# Patient Record
(demographics unavailable — no encounter records)

---

## 2024-11-18 NOTE — PHYSICAL EXAM
[Alert] : alert [Well Nourished] : well nourished [No Acute Distress] : no acute distress [Well Developed] : well developed [Normal Sclera/Conjunctiva] : normal sclera/conjunctiva [EOMI] : extra ocular movement intact [No Proptosis] : no proptosis [Normal Oropharynx] : the oropharynx was normal [Thyroid Not Enlarged] : the thyroid was not enlarged [No Thyroid Nodules] : no palpable thyroid nodules [No Respiratory Distress] : no respiratory distress [No Accessory Muscle Use] : no accessory muscle use [Clear to Auscultation] : lungs were clear to auscultation bilaterally [Normal PMI] : the apical impulse was normal [Normal S1, S2] : normal S1 and S2 [Normal Rate] : heart rate was normal [Regular Rhythm] : with a regular rhythm [Pedal Pulses Normal] : the pedal pulses are present [Normal Bowel Sounds] : normal bowel sounds [Not Tender] : non-tender [Not Distended] : not distended [Soft] : abdomen soft [Normal Anterior Cervical Nodes] : no anterior cervical lymphadenopathy [No Spinal Tenderness] : no spinal tenderness [Spine Straight] : spine straight [No Stigmata of Cushings Syndrome] : no stigmata of Cushings Syndrome [Normal Gait] : normal gait [Normal Strength/Tone] : muscle strength and tone were normal [No Rash] : no rash [Acanthosis Nigricans] : no acanthosis nigricans [Normal Reflexes] : deep tendon reflexes were 2+ and symmetric [No Tremors] : no tremors [Oriented x3] : oriented to person, place, and time [de-identified] : trace edema

## 2024-11-18 NOTE — HISTORY OF PRESENT ILLNESS
[FreeTextEntry1] : Patient with type 1 diabetes since she was a young child aged 4. She had been on Omni pod a few years. and Dexcom and Tandem T slim pump.  Her blood sugars are variable; she is mostly a snacker and did not always remember to bolus. She has had poor control for many years.  Recently was pregnant and had adjustments made in pump settings at [facility] and the RD. she is now postpartum 9 months. She did meet with Amy Ruth. [Continuous Glucose Monitoring] : Continuous Glucose Monitoring: Yes [Dexcom] : Dexcom [FreeTextEntry2] : 64 [FreeTextEntry3] : 22+11 [FreeTextEntry4] : 1.9 [de-identified] : 7.2 [FreeTextEntry6] : 08615

## 2024-11-18 NOTE — THERAPY
[_____] :  [unfilled] mg/dL [Insulin on Board (IOB) Duration = ____ hours] : Insulin on Board (IOB) Duration  = [unfilled] hours [de-identified] : Tandem

## 2024-11-18 NOTE — ASSESSMENT
[Diabetes Foot Care] : diabetes foot care [Long Term Vascular Complications] : long term vascular complications of diabetes [Carbohydrate Consistent Diet] : carbohydrate consistent diet [Importance of Diet and Exercise] : importance of diet and exercise to improve glycemic control, achieve weight loss and improve cardiovascular health [Hypoglycemia Management] : hypoglycemia management [Self Monitoring of Blood Glucose] : self monitoring of blood glucose [Retinopathy Screening] : Patient was referred to ophthalmology for retinopathy screening [Diabetic Medications] : Risks and benefits of diabetic medications were discussed [FreeTextEntry1] : Patient with significant improvement in her control of type 1 diabetes.  A1C now 6.5 GMI 7.2.  There is a lot of variability looking at her CGM download.  She basically can be stable all day and does not eat until dinnertime.  However around 4 to 5 PM her sugar to start to rise when she has an energy drink this has 0 carbs.  Will give herself 1 unit for that going forward since her sugar does go up to 200 at that point.  There have been some days that were very erratic when she had pump failures due to kinking.  She is due now for an upgrade of her tandem pump.  Will meet with CDE today to make a change in her basal settings for when she is in manual mode since it is quite elevated at 3 to 4 units an hour at certain times a day. Will change to 1.5.  Labs done today.